# Patient Record
Sex: FEMALE | Race: WHITE | Employment: FULL TIME | URBAN - METROPOLITAN AREA
[De-identification: names, ages, dates, MRNs, and addresses within clinical notes are randomized per-mention and may not be internally consistent; named-entity substitution may affect disease eponyms.]

---

## 2023-10-10 DIAGNOSIS — Z30.019 ENCOUNTER FOR FEMALE BIRTH CONTROL: Primary | ICD-10-CM

## 2023-10-10 RX ORDER — NORETHINDRONE ACETATE AND ETHINYL ESTRADIOL AND FERROUS FUMARATE 1MG-20(21)
1 KIT ORAL DAILY
COMMUNITY
End: 2023-10-10 | Stop reason: SDUPTHER

## 2023-10-12 RX ORDER — NORETHINDRONE ACETATE AND ETHINYL ESTRADIOL AND FERROUS FUMARATE 1MG-20(21)
1 KIT ORAL DAILY
Qty: 28 TABLET | Refills: 0 | Status: SHIPPED | OUTPATIENT
Start: 2023-10-12 | End: 2023-11-22 | Stop reason: SDUPTHER

## 2023-11-11 PROBLEM — F41.9 ANXIETY: Status: ACTIVE | Noted: 2023-11-11

## 2023-11-11 PROBLEM — R00.0 TACHYCARDIA: Status: ACTIVE | Noted: 2023-11-11

## 2023-11-11 PROBLEM — R00.2 PALPITATIONS: Status: ACTIVE | Noted: 2023-11-11

## 2023-11-11 PROBLEM — D50.9 IRON DEFICIENCY ANEMIA, UNSPECIFIED: Status: ACTIVE | Noted: 2023-11-11

## 2023-11-11 PROBLEM — N91.2 AMENORRHEA: Status: ACTIVE | Noted: 2023-11-11

## 2023-11-11 PROBLEM — O99.019 ANEMIA COMPLICATING PREGNANCY, UNSPECIFIED TRIMESTER (HHS-HCC): Status: ACTIVE | Noted: 2023-11-11

## 2023-11-11 PROBLEM — R42 DIZZINESS: Status: ACTIVE | Noted: 2023-11-11

## 2023-11-11 PROBLEM — R55 NEAR SYNCOPE: Status: ACTIVE | Noted: 2023-11-11

## 2023-11-11 PROBLEM — R42 LIGHTHEADED: Status: ACTIVE | Noted: 2023-11-11

## 2023-11-11 RX ORDER — LISINOPRIL 30 MG/1
30 TABLET ORAL DAILY
COMMUNITY
Start: 2023-10-09

## 2023-11-11 RX ORDER — METOPROLOL SUCCINATE 25 MG/1
25 TABLET, EXTENDED RELEASE ORAL DAILY
COMMUNITY
Start: 2023-05-09

## 2023-11-11 RX ORDER — ESCITALOPRAM OXALATE 10 MG/1
10 TABLET ORAL DAILY
COMMUNITY
Start: 2023-09-11 | End: 2023-12-11 | Stop reason: WASHOUT

## 2023-11-11 RX ORDER — FLUTICASONE PROPIONATE 50 MCG
1-2 SPRAY, SUSPENSION (ML) NASAL DAILY
COMMUNITY
Start: 2023-01-27 | End: 2023-12-11 | Stop reason: WASHOUT

## 2023-11-11 RX ORDER — TOPIRAMATE 25 MG/1
TABLET ORAL
COMMUNITY
Start: 2023-09-11

## 2023-11-11 RX ORDER — IBUPROFEN 800 MG/1
800 TABLET ORAL EVERY 6 HOURS PRN
COMMUNITY
Start: 2023-01-24

## 2023-11-11 RX ORDER — HYDROXYZINE HYDROCHLORIDE 25 MG/1
25 TABLET, FILM COATED ORAL 3 TIMES DAILY PRN
COMMUNITY
Start: 2023-07-18

## 2023-11-13 ENCOUNTER — APPOINTMENT (OUTPATIENT)
Dept: OPHTHALMOLOGY | Facility: CLINIC | Age: 43
End: 2023-11-13
Payer: COMMERCIAL

## 2023-11-13 ENCOUNTER — OFFICE VISIT (OUTPATIENT)
Dept: OPHTHALMOLOGY | Facility: CLINIC | Age: 43
End: 2023-11-13
Payer: COMMERCIAL

## 2023-11-13 DIAGNOSIS — H52.03 HYPEROPIA WITH PRESBYOPIA OF BOTH EYES: ICD-10-CM

## 2023-11-13 DIAGNOSIS — H52.4 HYPEROPIA WITH PRESBYOPIA OF BOTH EYES: ICD-10-CM

## 2023-11-13 DIAGNOSIS — H04.123 DRY EYE SYNDROME OF BOTH EYES: Primary | ICD-10-CM

## 2023-11-13 PROCEDURE — 92004 COMPRE OPH EXAM NEW PT 1/>: CPT | Performed by: STUDENT IN AN ORGANIZED HEALTH CARE EDUCATION/TRAINING PROGRAM

## 2023-11-13 PROCEDURE — 92015 DETERMINE REFRACTIVE STATE: CPT | Performed by: STUDENT IN AN ORGANIZED HEALTH CARE EDUCATION/TRAINING PROGRAM

## 2023-11-13 ASSESSMENT — CONF VISUAL FIELD
OS_SUPERIOR_TEMPORAL_RESTRICTION: 0
OS_INFERIOR_NASAL_RESTRICTION: 0
OD_SUPERIOR_TEMPORAL_RESTRICTION: 0
OD_NORMAL: 1
METHOD: COUNTING FINGERS
OS_NORMAL: 1
OD_INFERIOR_NASAL_RESTRICTION: 0
OS_INFERIOR_TEMPORAL_RESTRICTION: 0
OS_SUPERIOR_NASAL_RESTRICTION: 0
OD_INFERIOR_TEMPORAL_RESTRICTION: 0
OD_SUPERIOR_NASAL_RESTRICTION: 0

## 2023-11-13 ASSESSMENT — REFRACTION_MANIFEST
OD_CYLINDER: +0.25
OD_AXIS: 175
OS_CYLINDER: +0.75
OS_AXIS: 175
OS_ADD: +1.00
OD_SPHERE: +0.50
OS_SPHERE: +0.25
OD_ADD: +1.00

## 2023-11-13 ASSESSMENT — TEAR BREAK UP TIME (TBUT)
OS_TBUT: 5
OD_TBUT: 5

## 2023-11-13 ASSESSMENT — TONOMETRY
OD_IOP_MMHG: 16
OS_IOP_MMHG: 16
IOP_METHOD: GOLDMANN APPLANATION

## 2023-11-13 ASSESSMENT — ENCOUNTER SYMPTOMS
MUSCULOSKELETAL NEGATIVE: 0
PSYCHIATRIC NEGATIVE: 0
RESPIRATORY NEGATIVE: 0
ENDOCRINE NEGATIVE: 0
ALLERGIC/IMMUNOLOGIC NEGATIVE: 0
NEUROLOGICAL NEGATIVE: 0
CONSTITUTIONAL NEGATIVE: 0
GASTROINTESTINAL NEGATIVE: 0
HEMATOLOGIC/LYMPHATIC NEGATIVE: 0
EYES NEGATIVE: 0
CARDIOVASCULAR NEGATIVE: 0

## 2023-11-13 ASSESSMENT — VISUAL ACUITY
OD_SC: 20/20
METHOD: SNELLEN - LINEAR
OS_SC: 20/20

## 2023-11-13 ASSESSMENT — EXTERNAL EXAM - LEFT EYE: OS_EXAM: NORMAL

## 2023-11-13 ASSESSMENT — TEAR MENISCUS
OS_TEAR_MENISCUS: LOW MENISCUS
OD_TEAR_MENISCUS: LOW MENISCUS

## 2023-11-13 ASSESSMENT — SLIT LAMP EXAM - LIDS
COMMENTS: MGD UL/LL
COMMENTS: MGD UL/LL

## 2023-11-13 ASSESSMENT — CUP TO DISC RATIO
OD_RATIO: .30
OS_RATIO: .30

## 2023-11-13 ASSESSMENT — EXTERNAL EXAM - RIGHT EYE: OD_EXAM: NORMAL

## 2023-11-13 NOTE — PROGRESS NOTES
Assessment/Plan   Diagnoses and all orders for this visit:  Dry eye syndrome of both eyes  -discussed OTC Systane or Refersh gtt TID (gel tears), warm compresses (adarsh mask), saturnino at bedtime, and fish oil supplements  -next steps would be punctal plugs and Xiidra or Restasis  Hyperopia with presbyopia of both eyes  -New spec rx released today per patient request. Ocular health wnl for age OU. Monitor 1 year or sooner prn. Refraction billed today. Discussed first time PAL. Spec RX to help with night glare and strain with near vision.    RTC 1 year for annual with MERCEDES and ERICKA  
Admission

## 2023-11-22 DIAGNOSIS — Z30.019 ENCOUNTER FOR FEMALE BIRTH CONTROL: ICD-10-CM

## 2023-11-22 NOTE — TELEPHONE ENCOUNTER
Zan pt has ape sched for 12/11/23, pt needs refill of her ocp sent in, will be out Sunday, please send Junel Fe to [facility] pharmacy

## 2023-11-24 RX ORDER — NORETHINDRONE ACETATE AND ETHINYL ESTRADIOL AND FERROUS FUMARATE 1MG-20(21)
KIT ORAL
Qty: 28 TABLET | Refills: 1 | Status: SHIPPED | OUTPATIENT
Start: 2023-11-24

## 2023-12-11 ENCOUNTER — OFFICE VISIT (OUTPATIENT)
Dept: OBSTETRICS AND GYNECOLOGY | Facility: CLINIC | Age: 43
End: 2023-12-11
Payer: COMMERCIAL

## 2023-12-11 VITALS
WEIGHT: 152 LBS | DIASTOLIC BLOOD PRESSURE: 81 MMHG | BODY MASS INDEX: 27.97 KG/M2 | HEIGHT: 62 IN | SYSTOLIC BLOOD PRESSURE: 134 MMHG

## 2023-12-11 DIAGNOSIS — Z01.419 WELL WOMAN EXAM: Primary | ICD-10-CM

## 2023-12-11 DIAGNOSIS — Z86.69 HISTORY OF MIGRAINE WITH AURA: ICD-10-CM

## 2023-12-11 DIAGNOSIS — Z30.41 SURVEILLANCE FOR BIRTH CONTROL, ORAL CONTRACEPTIVES: ICD-10-CM

## 2023-12-11 DIAGNOSIS — Z30.09 COUNSELING FOR BIRTH CONTROL REGARDING INTRAUTERINE DEVICE (IUD): ICD-10-CM

## 2023-12-11 DIAGNOSIS — Z11.3 SCREEN FOR STD (SEXUALLY TRANSMITTED DISEASE): ICD-10-CM

## 2023-12-11 DIAGNOSIS — Z12.31 ENCOUNTER FOR SCREENING MAMMOGRAM FOR MALIGNANT NEOPLASM OF BREAST: ICD-10-CM

## 2023-12-11 DIAGNOSIS — K64.4 EXTERNAL HEMORRHOID: ICD-10-CM

## 2023-12-11 PROCEDURE — 99396 PREV VISIT EST AGE 40-64: CPT | Performed by: OBSTETRICS & GYNECOLOGY

## 2023-12-11 PROCEDURE — 1036F TOBACCO NON-USER: CPT | Performed by: OBSTETRICS & GYNECOLOGY

## 2023-12-11 PROCEDURE — 87205 SMEAR GRAM STAIN: CPT | Performed by: OBSTETRICS & GYNECOLOGY

## 2023-12-11 PROCEDURE — 87661 TRICHOMONAS VAGINALIS AMPLIF: CPT | Mod: 59 | Performed by: OBSTETRICS & GYNECOLOGY

## 2023-12-11 PROCEDURE — 87800 DETECT AGNT MULT DNA DIREC: CPT | Performed by: OBSTETRICS & GYNECOLOGY

## 2023-12-11 RX ORDER — DROSPIRENONE 4 MG/1
4 TABLET, FILM COATED ORAL DAILY
Qty: 28 TABLET | Refills: 11 | Status: SHIPPED | OUTPATIENT
Start: 2023-12-11

## 2023-12-11 RX ORDER — HYDROCORTISONE 25 MG/G
CREAM TOPICAL 2 TIMES DAILY
Qty: 28 G | Refills: 3 | Status: SHIPPED | OUTPATIENT
Start: 2023-12-11 | End: 2023-12-25

## 2023-12-11 ASSESSMENT — LIFESTYLE VARIABLES
AUDIT-C TOTAL SCORE: 2
HOW OFTEN DO YOU HAVE SIX OR MORE DRINKS ON ONE OCCASION: NEVER
HOW OFTEN DO YOU HAVE A DRINK CONTAINING ALCOHOL: 2-4 TIMES A MONTH
HOW MANY STANDARD DRINKS CONTAINING ALCOHOL DO YOU HAVE ON A TYPICAL DAY: 1 OR 2
SKIP TO QUESTIONS 9-10: 1

## 2023-12-11 ASSESSMENT — PATIENT HEALTH QUESTIONNAIRE - PHQ9
SUM OF ALL RESPONSES TO PHQ9 QUESTIONS 1 & 2: 2
2. FEELING DOWN, DEPRESSED OR HOPELESS: SEVERAL DAYS
1. LITTLE INTEREST OR PLEASURE IN DOING THINGS: SEVERAL DAYS

## 2023-12-11 ASSESSMENT — ENCOUNTER SYMPTOMS
DEPRESSION: 0
OCCASIONAL FEELINGS OF UNSTEADINESS: 0
LOSS OF SENSATION IN FEET: 0

## 2023-12-11 ASSESSMENT — PAIN SCALES - GENERAL: PAINLEVEL: 0-NO PAIN

## 2023-12-11 NOTE — PROGRESS NOTES
ESTABLISHED ANNUAL GYN VISIT     Patient Name:  Domitila Camacho  :  1980  MR #:  [id number]  Acct #:  [id number]      ASSESSMENT/PLAN:   1. Well woman exam      2. Encounter for screening mammogram for malignant neoplasm of breast    - BI mammo bilateral screening tomosynthesis; Future    3. Surveillance for birth control, oral contraceptives    - Slynd 4 mg (28) tablet; Take 1 tablet by mouth once daily.  Dispense: 28 tablet; Refill: 11    4. Screen for STD (sexually transmitted disease)    - Vaginitis Gram Stain For Bacterial Vaginosis + Yeast  - Trichomonas vaginalis, Nucleic Acid Detection  - C. Trachomatis / N. Gonorrhoeae, Amplified Detection    5. Counseling for birth control regarding intrauterine device (IUD)    - Vaginitis Gram Stain For Bacterial Vaginosis + Yeast  - Trichomonas vaginalis, Nucleic Acid Detection  - C. Trachomatis / N. Gonorrhoeae, Amplified Detection    6. External hemorrhoid    - hydrocortisone (Anusol-HC) 2.5 % rectal cream; Insert into the rectum 2 times a day for 14 days. For hemorrhoidal inflammation  Dispense: 28 g; Refill: 3    7. History of migraine with aura    - Slynd 4 mg (28) tablet; Take 1 tablet by mouth once daily.  Dispense: 28 tablet; Refill: 11     .All questions answered.    Counseling:  Medication education:  Education:  All new and/or current medications discussed and reviewed including side effects with patient/caregiver, Understanding:  Caregiver/Patient expressed understanding., Adherence:  Barriers to adherence identified and discussed if present.  She elects to try IUD for BC and reduction of symptomatic periods.    OB/GYN Preventive:     - Pap smear indicated every 5 years if normal and otherwise low risk - Next Pap smear due 2024. Last one was normal.  - Self breast exam monthly and clinical breast examination yearly discussed - Next Mammogram due Now  - Screening colonoscopy recommended starting age 45, then Q3-10 years depending on testing and family  history - Next screen due  at 47 yoa  - Osteoporosis prevention discussion included vit d3/calcium supplements, weight-bearing exercise - DEXA scan due at 67 yoa  - Genitourinary skin hygiene discussed.  - Diet/Weight management discussed.      Chief Complaint:  Annual exam    HPI:  Domitila Camacho is a 43 y.o. F  No LMP recorded. for annual exam.  Migraines getting worse, with aura. Periods are a couple times a year on the continous combination OCP.  C/O hemorrhoidal swelling and pain occasionally.     GYNH:   Yes  LMP:  No LMP recorded.  HPV Vaccine No.  Sexual activity Yes - . [place] Yes -.  Birth control history:     Past Medical History:   Diagnosis Date    Hypertension     Migraines        History reviewed. No pertinent surgical history.    Social History     Tobacco Use    Smoking status: Never    Smokeless tobacco: Never   Vaping Use    Vaping Use: Never used   Substance Use Topics    Alcohol use: Yes    Drug use: Yes     Types: Marijuana        No family history on file.    OB History          2    Para   2    Term   2       0    AB   0    Living   2         SAB   0    IAB   0    Ectopic   0    Multiple   0    Live Births   2                  Prior to Admission medications    Medication Sig Start Date End Date Taking? Authorizing Provider   epinephrine (EPIPEN INJ) EpiPen   Yes Historical Provider, MD   hydrOXYzine HCL (Atarax) 25 mg tablet Take 1 tablet (25 mg) by mouth 3 times a day as needed for anxiety. 23  Yes Historical Provider, MD   ibuprofen 800 mg tablet Take 1 tablet (800 mg) by mouth every 6 hours if needed (dental pain). 23  Yes Historical Provider, MD Cari CALVILLO , 28, 1 mg-20 mcg (21)/75 mg (7) tablet Take 1 tab PO daily 23  Yes Olivia Pérez DO   lisinopril 30 mg tablet Take 1 tablet (30 mg) by mouth once daily. 10/9/23  Yes Historical Provider, MD   metoprolol succinate XL (Toprol-XL) 25 mg 24 hr tablet Take 1 tablet (25 mg) by mouth once daily.  "5/9/23  Yes Historical Provider, MD   topiramate (Topamax) 25 mg tablet PLEASE SEE ATTACHED FOR DETAILED DIRECTIONS 9/11/23  Yes Historical Provider, MD   escitalopram (Lexapro) 10 mg tablet Take 1 tablet (10 mg) by mouth once daily. 9/11/23 12/11/23  Historical Provider, MD   fluticasone (Flonase) 50 mcg/actuation nasal spray Administer 1-2 sprays into each nostril once daily. 1/27/23 12/11/23  Historical Provider, MD       Allergies   Allergen Reactions    Bee Venom Protein (Honey Bee) Hives, Swelling and Rash    Covid-19 Vac, Bv (Moderna)(Pf) Rash         ROS:   [facility] - WOMEN ONLY:          Breast Lump No acute changes.  Hot Flashes Occasional.  Painful [place] no.  Vaginal Discharge - yes     [facility] - ROS Update:          Unexplained Weight Change no.  Pain anywhere in your body no.  Black or bloody stools no.  Problems with urination no.  Rashes or sores no.  Sexual problems no.  Depression or anxiety problems no.  Do you feel threatened by anyone No.      OBJECTIVE:   /81   Ht 1.575 m (5' 2\")   Wt 68.9 kg (152 lb)   BMI 27.80 kg/m²   Body mass index is 27.8 kg/m².     Physical Exam  GENERAL:   General Appearance:  well-developed, well-nourished, no functional handicap, well-groomed.  Hygiene:  good.  Ill-appearance:  none.  Mental Status:  alert and oriented.  Speech:  clear.  Eye contact:  normal.  Appears stated age:  yes.    LUNGS:  CTAB.  Effort:  no respiratory distress.    HEART:  HRRR with S1/S2 w/o M/C/R  BREASTS: General:  no masses, no tenderness, no skin changes, no nipple abnormality, and no axillary lymphadenopathy.   ABDOMEN: Tenderness:  none.  Distention:  none.   GENITOURINARY - FEMALE: Bladder:  normal.  Pelvic support defects:  not seen .  External genitalia:  Normal.  Urethra:  Normal.  Vagina:  no lesions, moderate discharge.  Cervix/ cuff:  normal appearance .  Uterus:  normal size/shape/consistency, non tender.  Adnexa:  normal , non tender. Pos hemorrhoidal tags with pink mucosa " visible.  DERMATOLOGY:  Skin:  no acute changes  EXTREMITIES: Normal:  no anomalies.  Edema:  none.    NEUROLOGICAL: Orientation:  alert and oriented x 3  PSYCHOLOGY: Affect:  appropriate.  Mood:  pleasant.    Labs reviewed: Yes -     Imaging reviewed: Yes -     Note: This dictation was generated using Dragon voice recognition software. Please excuse any grammatical or spelling errors that may have occurred using the system.

## 2023-12-12 LAB
C TRACH RRNA SPEC QL NAA+PROBE: NEGATIVE
CLUE CELLS VAG LPF-#/AREA: PRESENT /[LPF]
N GONORRHOEA DNA SPEC QL PROBE+SIG AMP: NEGATIVE
NUGENT SCORE: 10
T VAGINALIS RRNA SPEC QL NAA+PROBE: NEGATIVE
YEAST VAG WET PREP-#/AREA: ABNORMAL

## 2023-12-13 DIAGNOSIS — B96.89 BACTERIAL VAGINOSIS: Primary | ICD-10-CM

## 2023-12-13 DIAGNOSIS — N76.0 BACTERIAL VAGINOSIS: Primary | ICD-10-CM

## 2023-12-13 RX ORDER — METRONIDAZOLE 500 MG/1
TABLET ORAL
Qty: 14 TABLET | Refills: 3 | Status: SHIPPED | OUTPATIENT
Start: 2023-12-13

## 2023-12-19 ENCOUNTER — TELEPHONE (OUTPATIENT)
Dept: OBSTETRICS AND GYNECOLOGY | Facility: CLINIC | Age: 43
End: 2023-12-19
Payer: COMMERCIAL

## 2023-12-19 ENCOUNTER — PATIENT MESSAGE (OUTPATIENT)
Dept: OBSTETRICS AND GYNECOLOGY | Facility: CLINIC | Age: 43
End: 2023-12-19
Payer: COMMERCIAL

## 2023-12-19 DIAGNOSIS — B37.9 ANTIBIOTIC-INDUCED YEAST INFECTION: Primary | ICD-10-CM

## 2023-12-19 DIAGNOSIS — T36.95XA ANTIBIOTIC-INDUCED YEAST INFECTION: Primary | ICD-10-CM

## 2023-12-19 NOTE — TELEPHONE ENCOUNTER
Pt calling with c/o rash reaction from flagyl. Pt has one more pill left to take asking if she should finish. Advised not to take last one and to use hydrocortisone cream/benadryl. Pt states BV has cleared up as well.

## 2023-12-21 PROBLEM — B37.9 ANTIBIOTIC-INDUCED YEAST INFECTION: Status: ACTIVE | Noted: 2023-12-21

## 2023-12-21 PROBLEM — T36.95XA ANTIBIOTIC-INDUCED YEAST INFECTION: Status: ACTIVE | Noted: 2023-12-21

## 2023-12-21 RX ORDER — FLUCONAZOLE 150 MG/1
TABLET ORAL
Qty: 2 TABLET | Refills: 3 | Status: SHIPPED | OUTPATIENT
Start: 2023-12-21

## 2024-01-09 ENCOUNTER — ANCILLARY PROCEDURE (OUTPATIENT)
Dept: RADIOLOGY | Facility: CLINIC | Age: 44
End: 2024-01-09
Payer: COMMERCIAL

## 2024-01-09 VITALS — WEIGHT: 150 LBS | BODY MASS INDEX: 27.6 KG/M2 | HEIGHT: 62 IN

## 2024-01-09 DIAGNOSIS — Z12.31 ENCOUNTER FOR SCREENING MAMMOGRAM FOR MALIGNANT NEOPLASM OF BREAST: ICD-10-CM

## 2024-01-09 PROCEDURE — 77067 SCR MAMMO BI INCL CAD: CPT
